# Patient Record
Sex: FEMALE | Race: OTHER | URBAN - METROPOLITAN AREA
[De-identification: names, ages, dates, MRNs, and addresses within clinical notes are randomized per-mention and may not be internally consistent; named-entity substitution may affect disease eponyms.]

---

## 2019-03-12 ENCOUNTER — EMERGENCY (EMERGENCY)
Facility: HOSPITAL | Age: 34
LOS: 1 days | Discharge: HOME | End: 2019-03-12
Attending: EMERGENCY MEDICINE | Admitting: EMERGENCY MEDICINE
Payer: COMMERCIAL

## 2019-03-12 VITALS
RESPIRATION RATE: 16 BRPM | DIASTOLIC BLOOD PRESSURE: 72 MMHG | TEMPERATURE: 98 F | SYSTOLIC BLOOD PRESSURE: 100 MMHG | HEART RATE: 78 BPM | OXYGEN SATURATION: 98 %

## 2019-03-12 VITALS
HEART RATE: 105 BPM | SYSTOLIC BLOOD PRESSURE: 114 MMHG | HEIGHT: 64.96 IN | WEIGHT: 99.87 LBS | RESPIRATION RATE: 18 BRPM | TEMPERATURE: 98 F | DIASTOLIC BLOOD PRESSURE: 78 MMHG

## 2019-03-12 DIAGNOSIS — Z3A.01 LESS THAN 8 WEEKS GESTATION OF PREGNANCY: ICD-10-CM

## 2019-03-12 DIAGNOSIS — O20.9 HEMORRHAGE IN EARLY PREGNANCY, UNSPECIFIED: ICD-10-CM

## 2019-03-12 DIAGNOSIS — R10.2 PELVIC AND PERINEAL PAIN: ICD-10-CM

## 2019-03-12 DIAGNOSIS — R10.9 UNSPECIFIED ABDOMINAL PAIN: ICD-10-CM

## 2019-03-12 LAB
ALBUMIN SERPL ELPH-MCNC: 5 G/DL — SIGNIFICANT CHANGE UP (ref 3.3–5)
ALP SERPL-CCNC: 48 U/L — SIGNIFICANT CHANGE UP (ref 40–120)
ALT FLD-CCNC: 9 U/L — LOW (ref 10–45)
ANION GAP SERPL CALC-SCNC: 13 MMOL/L — SIGNIFICANT CHANGE UP (ref 5–17)
APPEARANCE UR: CLEAR — SIGNIFICANT CHANGE UP
AST SERPL-CCNC: 17 U/L — SIGNIFICANT CHANGE UP (ref 10–40)
BASOPHILS # BLD AUTO: 0.03 K/UL — SIGNIFICANT CHANGE UP (ref 0–0.2)
BASOPHILS NFR BLD AUTO: 0.5 % — SIGNIFICANT CHANGE UP (ref 0–2)
BILIRUB SERPL-MCNC: 0.4 MG/DL — SIGNIFICANT CHANGE UP (ref 0.2–1.2)
BILIRUB UR-MCNC: NEGATIVE — SIGNIFICANT CHANGE UP
BLD GP AB SCN SERPL QL: NEGATIVE — SIGNIFICANT CHANGE UP
BUN SERPL-MCNC: 7 MG/DL — SIGNIFICANT CHANGE UP (ref 7–23)
CALCIUM SERPL-MCNC: 9.9 MG/DL — SIGNIFICANT CHANGE UP (ref 8.4–10.5)
CHLORIDE SERPL-SCNC: 102 MMOL/L — SIGNIFICANT CHANGE UP (ref 96–108)
CO2 SERPL-SCNC: 23 MMOL/L — SIGNIFICANT CHANGE UP (ref 22–31)
COLOR SPEC: YELLOW — SIGNIFICANT CHANGE UP
CREAT SERPL-MCNC: 0.58 MG/DL — SIGNIFICANT CHANGE UP (ref 0.5–1.3)
DIFF PNL FLD: ABNORMAL
EOSINOPHIL # BLD AUTO: 0.06 K/UL — SIGNIFICANT CHANGE UP (ref 0–0.5)
EOSINOPHIL NFR BLD AUTO: 1 % — SIGNIFICANT CHANGE UP (ref 0–6)
GLUCOSE SERPL-MCNC: 115 MG/DL — HIGH (ref 70–99)
GLUCOSE UR QL: NEGATIVE — SIGNIFICANT CHANGE UP
HCT VFR BLD CALC: 43.6 % — SIGNIFICANT CHANGE UP (ref 34.5–45)
HGB BLD-MCNC: 14.2 G/DL — SIGNIFICANT CHANGE UP (ref 11.5–15.5)
IMM GRANULOCYTES NFR BLD AUTO: 0.2 % — SIGNIFICANT CHANGE UP (ref 0–1.5)
KETONES UR-MCNC: 15 MG/DL
LEUKOCYTE ESTERASE UR-ACNC: NEGATIVE — SIGNIFICANT CHANGE UP
LYMPHOCYTES # BLD AUTO: 1.64 K/UL — SIGNIFICANT CHANGE UP (ref 1–3.3)
LYMPHOCYTES # BLD AUTO: 28.1 % — SIGNIFICANT CHANGE UP (ref 13–44)
MCHC RBC-ENTMCNC: 30 PG — SIGNIFICANT CHANGE UP (ref 27–34)
MCHC RBC-ENTMCNC: 32.6 GM/DL — SIGNIFICANT CHANGE UP (ref 32–36)
MCV RBC AUTO: 92.2 FL — SIGNIFICANT CHANGE UP (ref 80–100)
MONOCYTES # BLD AUTO: 0.51 K/UL — SIGNIFICANT CHANGE UP (ref 0–0.9)
MONOCYTES NFR BLD AUTO: 8.7 % — SIGNIFICANT CHANGE UP (ref 2–14)
NEUTROPHILS # BLD AUTO: 3.58 K/UL — SIGNIFICANT CHANGE UP (ref 1.8–7.4)
NEUTROPHILS NFR BLD AUTO: 61.5 % — SIGNIFICANT CHANGE UP (ref 43–77)
NITRITE UR-MCNC: NEGATIVE — SIGNIFICANT CHANGE UP
NRBC # BLD: 0 /100 WBCS — SIGNIFICANT CHANGE UP (ref 0–0)
PH UR: 6 — SIGNIFICANT CHANGE UP (ref 5–8)
PLATELET # BLD AUTO: 344 K/UL — SIGNIFICANT CHANGE UP (ref 150–400)
POTASSIUM SERPL-MCNC: 4.1 MMOL/L — SIGNIFICANT CHANGE UP (ref 3.5–5.3)
POTASSIUM SERPL-SCNC: 4.1 MMOL/L — SIGNIFICANT CHANGE UP (ref 3.5–5.3)
PROT SERPL-MCNC: 8.3 G/DL — SIGNIFICANT CHANGE UP (ref 6–8.3)
PROT UR-MCNC: ABNORMAL MG/DL
RBC # BLD: 4.73 M/UL — SIGNIFICANT CHANGE UP (ref 3.8–5.2)
RBC # FLD: 12.3 % — SIGNIFICANT CHANGE UP (ref 10.3–14.5)
RH IG SCN BLD-IMP: POSITIVE — SIGNIFICANT CHANGE UP
SODIUM SERPL-SCNC: 138 MMOL/L — SIGNIFICANT CHANGE UP (ref 135–145)
SP GR SPEC: 1.02 — SIGNIFICANT CHANGE UP (ref 1–1.03)
UROBILINOGEN FLD QL: 0.2 E.U./DL — SIGNIFICANT CHANGE UP
WBC # BLD: 5.83 K/UL — SIGNIFICANT CHANGE UP (ref 3.8–10.5)
WBC # FLD AUTO: 5.83 K/UL — SIGNIFICANT CHANGE UP (ref 3.8–10.5)

## 2019-03-12 PROCEDURE — 86850 RBC ANTIBODY SCREEN: CPT

## 2019-03-12 PROCEDURE — 81001 URINALYSIS AUTO W/SCOPE: CPT

## 2019-03-12 PROCEDURE — 86900 BLOOD TYPING SEROLOGIC ABO: CPT

## 2019-03-12 PROCEDURE — 84702 CHORIONIC GONADOTROPIN TEST: CPT

## 2019-03-12 PROCEDURE — 76856 US EXAM PELVIC COMPLETE: CPT | Mod: 26

## 2019-03-12 PROCEDURE — 76830 TRANSVAGINAL US NON-OB: CPT

## 2019-03-12 PROCEDURE — 87086 URINE CULTURE/COLONY COUNT: CPT

## 2019-03-12 PROCEDURE — 86901 BLOOD TYPING SEROLOGIC RH(D): CPT

## 2019-03-12 PROCEDURE — 76856 US EXAM PELVIC COMPLETE: CPT

## 2019-03-12 PROCEDURE — 99284 EMERGENCY DEPT VISIT MOD MDM: CPT

## 2019-03-12 PROCEDURE — 85025 COMPLETE CBC W/AUTO DIFF WBC: CPT

## 2019-03-12 PROCEDURE — 76830 TRANSVAGINAL US NON-OB: CPT | Mod: 26

## 2019-03-12 PROCEDURE — 36415 COLL VENOUS BLD VENIPUNCTURE: CPT

## 2019-03-12 PROCEDURE — 80053 COMPREHEN METABOLIC PANEL: CPT

## 2019-03-12 RX ORDER — NITROFURANTOIN MACROCRYSTAL 50 MG
1 CAPSULE ORAL
Qty: 14 | Refills: 0 | OUTPATIENT
Start: 2019-03-12 | End: 2019-03-18

## 2019-03-12 NOTE — ED ADULT NURSE NOTE - OBJECTIVE STATEMENT
Patient is a 32yo female reporting she had a positive pregnancy test yesterday and then developed mild lower abdominal cramping and vaginal bleeding with clots. Denies any fevers, n/v/d. One full-term pregnancy in the past.

## 2019-03-12 NOTE — ED PROVIDER NOTE - CLINICAL SUMMARY MEDICAL DECISION MAKING FREE TEXT BOX
Patient with vaginal bleeding and  pregnant. Beta hcg 65 , RH + and sono shows no IUP. Most likely a miscarriage however with risk of possible ectopic  flying would not be recommend. GYn was consulted and recommend no flights, repeat beta in two days in the ED. Return to ED if condition worsen

## 2019-03-12 NOTE — ED PROVIDER NOTE - NSFOLLOWUPINSTRUCTIONS_ED_ALL_ED_FT
Must return to ED in two days for repeat beta hcg. Prescription was sent to ED for + UA and u/c pending. Due to possibility of UTI and pregnancy will tx. Patient may be in [place] at time of urine culture returns.

## 2019-03-12 NOTE — ED PROVIDER NOTE - OBJECTIVE STATEMENT
32 y/o f with no pmh present to ED c/o vaginal bleeding since yesterday. Report of + pregnancy, , LMP 19 unconfirmed IUP. Patient is  from [place]. She state of bleeding has worsen. DEnies dizziness, sob, chest pain, fall, dysuria, or recent intercourse. No prior h/o ectopic pregnancy.

## 2019-03-12 NOTE — ED ADULT NURSE REASSESSMENT NOTE - NS ED NURSE REASSESS COMMENT FT1
All tests, US resulted, no new symptom complaint.  Vital signs stable.  Discharged to home in stable condition.

## 2019-03-12 NOTE — ED PROVIDER NOTE - ATTENDING CONTRIBUTION TO CARE
34yo F no PMH here w/ complaint of pregnancy, LMP end dec, vaginal spotting since yesterday. . pelvic as per PA w/ mod vaginal bleeding. bhcg 65, rh+. will check US and d/w GYN

## 2019-03-12 NOTE — ED ADULT TRIAGE NOTE - CHIEF COMPLAINT QUOTE
Patient a , just arrived from [place], states pregnancy test yesterday, a few hours later noted vaginal spotting, became stronger w/ passage of clots and tissues, mild abdominal cramping pain, w/ fatigue and back pain, no fever.  LMP end of January, states usual menstrual cycle is 45 days.

## 2019-03-12 NOTE — CONSULT NOTE ADULT - SUBJECTIVE AND OBJECTIVE BOX
34yo  at 7 wks by LMP 19 presents to the ED complaining of small vaginal bleeding and lower abdominal cramping since yesterday. Pt states abdominal pain is crampy in nature and more on the left side than the right side. She has radiating pain to her left flank. Pt denies taking any pain medications at home. She has small vaginal bleeding saturating less than 1 pad since the morning. She describes the bleeding as dark red with passage of small clots. Pt is a  from [facility] and stopped at [place] yesterday and was supposed to go back to [place] tomorrow. She is traveling with colleague who is with her today. Pt states this is a desired pregnancy although not planned. Pt found out she was pregnant by doing a home urine pregnancy test yesterday. She has not followed up with OB yet but has scheduled appointment this coming Friday in [place].   Pt denies fever, chills, chest pain, SOB, nausea, vomiting, lightheadedness or dizziness.     OB/GYN Hx:     x1 in 2016 per Pt preference- baby was full term no post-op complications  Pt states she has a hx of uterine fibroids   Last PAP smear- 2019- normal   Pt denies hx of STDs, HSV    PMHx: Pt denies   SHx:  x1   Meds: Pt denies   NKDA     PHYSICAL EXAM:   Vital Signs Last 24 Hrs  T(C): 36.9 (12 Mar 2019 15:34), Max: 36.9 (12 Mar 2019 15:34)  T(F): 98.4 (12 Mar 2019 15:34), Max: 98.4 (12 Mar 2019 15:34)  HR: 78 (12 Mar 2019 15:34) (77 - 105)  BP: 100/72 (12 Mar 2019 15:34) (100/68 - 114/78)  BP(mean): --  RR: 16 (12 Mar 2019 15:34) (16 - 18)  SpO2: 98% (12 Mar 2019 15:34) (97% - 98%)    **************************  Constitutional: No acute distress  Respiratory: Clear to ausculation bilaterally  Cardiovascular: regular rate and rhythm, no murmurs, or gallops  Gastrointestinal: soft, tenderness to palpation lower abdomen LLQ>RLQ, positive bowel sounds, no rebound or guarding, no CVA tenderness   Pelvic exam: Pt refused   Extremities: no calf tenderness or swelling    LABS:                        14.2   5.83  )-----------( 344      ( 12 Mar 2019 10:54 )             43.6         138  |  102  |  7   ----------------------------<  115<H>  4.1   |  23  |  0.58    Ca    9.9      12 Mar 2019 10:54    TPro  8.3  /  Alb  5.0  /  TBili  0.4  /  DBili  x   /  AST  17  /  ALT  9<L>  /  AlkPhos  48        Urinalysis Basic - ( 12 Mar 2019 10:54 )    Color: Yellow / Appearance: Clear / S.025 / pH: x  Gluc: x / Ketone: 15 mg/dL  / Bili: Negative / Urobili: 0.2 E.U./dL   Blood: x / Protein: Trace mg/dL / Nitrite: NEGATIVE   Leuk Esterase: NEGATIVE / RBC: Many /HPF / WBC 5-10 /HPF   Sq Epi: x / Non Sq Epi: Moderate /HPF / Bacteria: Present /HPF    HCG Quantitative, Serum: 65.4 mIU/mL ( @ 10:54)    RADIOLOGY & ADDITIONAL STUDIES:  EXAM:  US TRANSVAGINAL                          EXAM:  US PELVIC COMPLETE                          PROCEDURE DATE:  2019          INTERPRETATION:  PELVIC ULTRASOUND dated 3/12/2019 11:51 AM    INDICATION: 33-year-old female. vaginal spotting / pregnancy  LMP:   2019. Beta-hCG 65.4 miu/ml .    TECHNIQUE: Transabdominal views of the pelvis were obtained followed by   transvaginal views for better visualization of the ovaries.      PRIOR STUDIES: None    FINDINGS:   By dates 7 weeks 0 days pregnant.    These images demonstrate the uterus to be retroverted. The uterus is   normal in size and shape.  The uterus is 8.6 x 4.5 x 6.4 cm.  At least 2   intramural uterine leiomyomata including 1.8 cm right body and 0.8 cm   posterior body..  The endometrium is 0.7 cm in thickness, which is   normal. Fluid noted in the endocervical canal.    The right ovary is normal in size, measuring 2.1 x 1.6 x 2.1 cm. No right   ovarian masses are seen. The left ovary is normal in size, measuring 2.9   x 1.7 x 1.4 cm. No left ovarian masses are seen. Doppler evaluation   demonstrates flow to both ovaries with no evidence of torsion.    No free fluid is seen in the cul-de-sac.      IMPRESSION: No intrauterine or extrauterine pregnancy identified. Fluid   noted in relation to the endocervical canal. Could represent spontaneous   . No retained products of conception in uterus. Follow-up with   beta hCG and pelvic ultrasound recommended.    "Thank you for the opportunity to participate in the care of this   patient."    KERI GANT M.D., ATTENDING RADIOLOGIST  This document has been electronically signed. Mar 12 2019 12:40PM

## 2019-03-12 NOTE — CONSULT NOTE ADULT - ASSESSMENT
32yo  at 7 wks by LMP 19 presents to the ED with pregnancy of unknown location. bHCG is 65.4 and TVUS no IUP or EUP. Vitals stable and labs within normal. Rh+. Plan for Pt to return in 48 hours for repeat bHCG on 3/14/19. Differential diagnoses includes early pregnancy, missed , or ectopic pregnancy. Pt given ectopic pregnancy precautions including severe abdominal pain, lightheaded, or palpitations. She understands that an ectopic pregnancy is life threatening and can cause death from hemorrhage. Therefore she is highly advised not to travel to [place] until cleared by OB/GYN here. She agrees with plan and states she will follow up with us here at [facility] ED for repeat bHCG in 48hrs. Pt can take Tylenol at home for pain. Pt discussed with Dr. Norris.

## 2019-03-13 LAB
CULTURE RESULTS: SIGNIFICANT CHANGE UP
SPECIMEN SOURCE: SIGNIFICANT CHANGE UP

## 2019-03-14 ENCOUNTER — EMERGENCY (EMERGENCY)
Facility: HOSPITAL | Age: 34
LOS: 1 days | Discharge: HOME | End: 2019-03-14
Attending: EMERGENCY MEDICINE | Admitting: EMERGENCY MEDICINE
Payer: COMMERCIAL

## 2019-03-14 VITALS
RESPIRATION RATE: 18 BRPM | HEART RATE: 65 BPM | SYSTOLIC BLOOD PRESSURE: 111 MMHG | TEMPERATURE: 98 F | DIASTOLIC BLOOD PRESSURE: 68 MMHG | OXYGEN SATURATION: 100 %

## 2019-03-14 VITALS
OXYGEN SATURATION: 100 % | HEART RATE: 88 BPM | HEIGHT: 59.84 IN | SYSTOLIC BLOOD PRESSURE: 101 MMHG | RESPIRATION RATE: 18 BRPM | DIASTOLIC BLOOD PRESSURE: 70 MMHG | WEIGHT: 106.92 LBS | TEMPERATURE: 98 F

## 2019-03-14 DIAGNOSIS — O20.9 HEMORRHAGE IN EARLY PREGNANCY, UNSPECIFIED: ICD-10-CM

## 2019-03-14 DIAGNOSIS — Z79.2 LONG TERM (CURRENT) USE OF ANTIBIOTICS: ICD-10-CM

## 2019-03-14 DIAGNOSIS — O20.0 THREATENED ABORTION: ICD-10-CM

## 2019-03-14 DIAGNOSIS — Z3A.00 WEEKS OF GESTATION OF PREGNANCY NOT SPECIFIED: ICD-10-CM

## 2019-03-14 LAB
BASOPHILS # BLD AUTO: 0.03 K/UL — SIGNIFICANT CHANGE UP (ref 0–0.2)
BASOPHILS NFR BLD AUTO: 0.7 % — SIGNIFICANT CHANGE UP (ref 0–2)
EOSINOPHIL # BLD AUTO: 0.19 K/UL — SIGNIFICANT CHANGE UP (ref 0–0.5)
EOSINOPHIL NFR BLD AUTO: 4.7 % — SIGNIFICANT CHANGE UP (ref 0–6)
HCG SERPL-ACNC: 14.2 MIU/ML — HIGH
HCT VFR BLD CALC: 41.1 % — SIGNIFICANT CHANGE UP (ref 34.5–45)
HGB BLD-MCNC: 13.1 G/DL — SIGNIFICANT CHANGE UP (ref 11.5–15.5)
IMM GRANULOCYTES NFR BLD AUTO: 0.2 % — SIGNIFICANT CHANGE UP (ref 0–1.5)
LYMPHOCYTES # BLD AUTO: 1.39 K/UL — SIGNIFICANT CHANGE UP (ref 1–3.3)
LYMPHOCYTES # BLD AUTO: 34.7 % — SIGNIFICANT CHANGE UP (ref 13–44)
MCHC RBC-ENTMCNC: 29.8 PG — SIGNIFICANT CHANGE UP (ref 27–34)
MCHC RBC-ENTMCNC: 31.9 GM/DL — LOW (ref 32–36)
MCV RBC AUTO: 93.4 FL — SIGNIFICANT CHANGE UP (ref 80–100)
MONOCYTES # BLD AUTO: 0.44 K/UL — SIGNIFICANT CHANGE UP (ref 0–0.9)
MONOCYTES NFR BLD AUTO: 11 % — SIGNIFICANT CHANGE UP (ref 2–14)
NEUTROPHILS # BLD AUTO: 1.95 K/UL — SIGNIFICANT CHANGE UP (ref 1.8–7.4)
NEUTROPHILS NFR BLD AUTO: 48.7 % — SIGNIFICANT CHANGE UP (ref 43–77)
NRBC # BLD: 0 /100 WBCS — SIGNIFICANT CHANGE UP (ref 0–0)
PLATELET # BLD AUTO: 295 K/UL — SIGNIFICANT CHANGE UP (ref 150–400)
RBC # BLD: 4.4 M/UL — SIGNIFICANT CHANGE UP (ref 3.8–5.2)
RBC # FLD: 12.4 % — SIGNIFICANT CHANGE UP (ref 10.3–14.5)
WBC # BLD: 4.01 K/UL — SIGNIFICANT CHANGE UP (ref 3.8–10.5)
WBC # FLD AUTO: 4.01 K/UL — SIGNIFICANT CHANGE UP (ref 3.8–10.5)

## 2019-03-14 PROCEDURE — 99284 EMERGENCY DEPT VISIT MOD MDM: CPT

## 2019-03-14 PROCEDURE — 84702 CHORIONIC GONADOTROPIN TEST: CPT

## 2019-03-14 PROCEDURE — 99284 EMERGENCY DEPT VISIT MOD MDM: CPT | Mod: 25

## 2019-03-14 PROCEDURE — 36415 COLL VENOUS BLD VENIPUNCTURE: CPT

## 2019-03-14 PROCEDURE — 85025 COMPLETE CBC W/AUTO DIFF WBC: CPT

## 2019-03-14 NOTE — ED PROVIDER NOTE - ADDITIONAL RISK FACTOR FREE TEXT BOX
VB in early pregnancy, no pain.  HDS.  Marked downtrend  in HCG, suspect miscarriage.  Seen by GYN, repeat imaging not needed.  Patient will follow up with her GYN in [facility] in a few days.

## 2019-03-14 NOTE — ED PROVIDER NOTE - ATTENDING CONTRIBUTION TO CARE
34 yo fem seen here 2 days ago for pelvic cramps and VB, preg positive with HCG 65, US pregnancy undetermined location.  Here for repeat HCG.  Not having any pelvic pain/cramping.  Still with light bleeding, 1 pad/day.  Not weak/lightheaded.  Rh positive last visit.  She delayed her return home to [place] as recommended due to pregnancy of unknown location.  LMP 19  OB Hx       Agree with above HPI of PA    PE - no acute distress  Pt reports light bleeding only   Pelvic referred to GYN but pt declines  Otherwise normal exam    Pt feels ok to be discharged and fly home to [place]  B-hcg most likely declining and pt having miscarriage.    Pt aware to follow up with GYN upon returning home.

## 2019-03-14 NOTE — ED PROVIDER NOTE - OBJECTIVE STATEMENT
32 yo fem seen here 2 days ago for pelvic cramps and VB, preg positive with HCG 65, US pregnancy undetermined location.  Here for repeat HCG.  Not having any pelvic pain/cramping.  Still with light bleeding, 1 pad/day.  Not weak/lightheaded.  Rh positive last visit.  She delayed her return home to [place] as recommended due to pregnancy of unknown location.  LMP 19  OB Hx

## 2019-03-14 NOTE — CONSULT NOTE ADULT - SUBJECTIVE AND OBJECTIVE BOX
32yo  at 7 wks 2d by LMP 19 presents to the ED for repeat bHCG check. She was here two days ago complaining of some vaginal bleeding and lower abdominal pain..  Pt denies fever, chills, chest pain, SOB, abdominal pain, nausea, vomiting, vaginal bleeding    OB/GYN Hx:     x1 in 2016 per Pt preference- baby was full term no post-op complications  Pt states she has a hx of uterine fibroids   Last PAP smear- 2019- normal   Pt denies hx of STDs, HSV    PMHx: Pt denies   SHx:  x1   Meds: Pt denies   NKDA     PHYSICAL EXAM:   Vital Signs Last 24 Hrs  T(C): 36.8 (14 Mar 2019 09:42), Max: 36.8 (14 Mar 2019 09:42)  T(F): 98.3 (14 Mar 2019 09:42), Max: 98.3 (14 Mar 2019 09:42)  HR: 88 (14 Mar 2019 09:42) (88 - 88)  BP: 101/70 (14 Mar 2019 09:42) (101/70 - 101/70)  BP(mean): --  RR: 18 (14 Mar 2019 09:42) (18 - 18)  SpO2: 100% (14 Mar 2019 09:42) (100% - 100%)    **************************  Constitutional: Alert & Oriented x3, No acute distress  Respiratory: Clear to ausculation bilaterally; no wheezing, rhonchi, or crackles  Cardiovascular: regular rate and rhythm, no murmurs, or gallops  Gastrointestinal: soft, non tender, positive bowel sounds, no rebound or guarding   Pelvic exam:   Extremities: no calf tenderness or swelling    LABS:                        13.1   4.01  )-----------( 295      ( 14 Mar 2019 10:26 )             41.1     03-12    138  |  102  |  7   ----------------------------<  115<H>  4.1   |  23  |  0.58    Ca    9.9      12 Mar 2019 10:54    TPro  8.3  /  Alb  5.0  /  TBili  0.4  /  DBili  x   /  AST  17  /  ALT  9<L>  /  AlkPhos  48  03-12      Urinalysis Basic - ( 12 Mar 2019 10:54 )    Color: Yellow / Appearance: Clear / S.025 / pH: x  Gluc: x / Ketone: 15 mg/dL  / Bili: Negative / Urobili: 0.2 E.U./dL   Blood: x / Protein: Trace mg/dL / Nitrite: NEGATIVE   Leuk Esterase: NEGATIVE / RBC: Many /HPF / WBC 5-10 /HPF   Sq Epi: x / Non Sq Epi: Moderate /HPF / Bacteria: Present /HPF    RADIOLOGY & ADDITIONAL STUDIES: 34yo  at 7 wks 2d by LMP 19 presents to the ED for repeat bHCG check. She was here two days ago complaining of some vaginal bleeding and lower abdominal pain and had a bHCG of 65. Today Pt reports bleeding has decreased and she has saturated through 1 pad since last night. She states she has mild lower abdominal cramping and she hasn't taken any pain medication for it. Pt is eager to go back to [place].   Pt denies fever, chills, chest pain, SOB, nausea, vomiting, lightheadedness or dizziness.     OB/GYN Hx:     x1 in 2016 per Pt preference- baby was full term no post-op complications  Pt states she has a hx of uterine fibroids   Last PAP smear- 2019- normal   Pt denies hx of STDs, HSV    PMHx: Pt denies   SHx:  x1   Meds: Pt denies   NKDA     PHYSICAL EXAM:   Vital Signs Last 24 Hrs  T(C): 36.8 (14 Mar 2019 09:42), Max: 36.8 (14 Mar 2019 09:42)  T(F): 98.3 (14 Mar 2019 09:42), Max: 98.3 (14 Mar 2019 09:42)  HR: 88 (14 Mar 2019 09:42) (88 - 88)  BP: 101/70 (14 Mar 2019 09:42) (101/70 - 101/70)  BP(mean): --  RR: 18 (14 Mar 2019 09:42) (18 - 18)  SpO2: 100% (14 Mar 2019 09:42) (100% - 100%)    **************************  Constitutional: Alert & Oriented x3, No acute distress  Respiratory: Clear to ausculation bilaterally  Cardiovascular: regular rate and rhythm   Gastrointestinal: soft, nontender, positive bowel sounds, no rebound or guarding   Pelvic exam: Pt refused   Extremities: no calf tenderness or swelling    LABS:                        13.1   4.01  )-----------( 295      ( 14 Mar 2019 10:26 )             41.1     03-12    138  |  102  |  7   ----------------------------<  115<H>  4.1   |  23  |  0.58    Ca    9.9      12 Mar 2019 10:54    TPro  8.3  /  Alb  5.0  /  TBili  0.4  /  DBili  x   /  AST  17  /  ALT  9<L>  /  AlkPhos  48  03-12      Urinalysis Basic - ( 12 Mar 2019 10:54 )    Color: Yellow / Appearance: Clear / S.025 / pH: x  Gluc: x / Ketone: 15 mg/dL  / Bili: Negative / Urobili: 0.2 E.U./dL   Blood: x / Protein: Trace mg/dL / Nitrite: NEGATIVE   Leuk Esterase: NEGATIVE / RBC: Many /HPF / WBC 5-10 /HPF   Sq Epi: x / Non Sq Epi: Moderate /HPF / Bacteria: Present /HPF

## 2019-03-14 NOTE — ED ADULT TRIAGE NOTE - OTHER COMPLAINTS
A0 As per pt, pt was also diagnosed with UTI 2 days ago; however, pt has not been taking antibiotic. Pt C/O vaginal bleeding. Pt denies abdominal pian, burning urination, fever, chills, chest pain, SOB, dizziness.

## 2019-03-14 NOTE — ED ADULT NURSE NOTE - OBJECTIVE STATEMENT
32 y/o female c/o vaginal bleeding, was seen at [facility] 2 days ago and instructed to return to ED for follow up hcg level to check for possible miscarriage.

## 2019-03-14 NOTE — ED PROVIDER NOTE - NSFOLLOWUPINSTRUCTIONS_ED_ALL_ED_FT
Please follow up with your OB-GYN when you return to [place].  Return to the Emergency Room for any new/worsening symptoms, or any concerns at all.  ________________________________________________________________________________  Miscarriage  A miscarriage is the loss of an unborn baby (fetus) before the 20th week of pregnancy.    Follow these instructions at home:  Medicines     Take over-the-counter and prescription medicines only as told by your doctor.  If you were prescribed antibiotic medicine, take it as told by your doctor. Do not stop taking the antibiotic even if you start to feel better.  Image Do not take NSAIDs unless your doctor says that this is safe for you. NSAIDs include aspirin and ibuprofen. These medicines can cause bleeding.  Activity     Rest as directed. Ask your doctor what activities are safe for you.  Have someone help you at home during this time.  General instructions     Write down how many pads you use each day and how soaked they are.  Watch the amount of tissue or clumps of blood (blood clots) that you pass from your vagina. Save any large amounts of tissue for your doctor.  Do not use tampons, douche, or have sex until your doctor approves.  To help you and your partner with the process of grieving, talk with your doctor or seek counseling.  When you are ready, meet with your doctor to talk about steps you should take for your health. Also, talk with your doctor about steps to take to have a healthy pregnancy in the future.  Keep all follow-up visits as told by your doctor. This is important.  Contact a doctor if:  You have a fever or chills.  You have vaginal discharge that smells bad.  You have more bleeding.  Get help right away if:  You have very bad cramps or pain in your back or belly.  You pass clumps of blood that are walnut-sized or larger from your vagina.  You pass tissue that is walnut-sized or larger from your vagina.  You soak more than 1 regular pad in an hour.  You get light-headed or weak.  You faint (pass out).  You have feelings of sadness that do not go away, or you have thoughts of hurting yourself.  Summary  A miscarriage is the loss of an unborn baby before the 20th week of pregnancy.  Follow your doctor's instructions for home care. Keep all follow-up appointments.  To help you and your partner with the process of grieving, talk with your doctor or seek counseling.  This information is not intended to replace advice given to you by your health care provider. Make sure you discuss any questions you have with your health care provider.

## 2019-03-14 NOTE — CONSULT NOTE ADULT - ASSESSMENT
34yo  at 7 wks 2d by LMP 19 with incomplete . Pt's bHCG is has decreased from 65.4 to 14.2 consistent with missed . Vitals within normal range and hemoglobin. She is hemodynamically stable and refused pelvic examination. Pt given option for expectant management, medical management with Cytotec (with risks such as abdominal pain, nausea, vomiting, diarrhea discussed), and option of dilation and curettage (with risks discussed). Pt opted for expectant management. Pt understands that she needs to follow up with her OB/GYN next week in [facility]. She is to go to nearest ED if fever >100.4, severe abdominal pain, or heavy vaginal bleeding. Over the counter Motrin or Tylenol for pain. Pt discussed with Dr. Shah.

## 2019-03-14 NOTE — ED PROVIDER NOTE - CLINICAL SUMMARY MEDICAL DECISION MAKING FREE TEXT BOX
VB in early pregnancy, no pain.  HDS.  Marked downtrend  in HCG, suspect miscarriage.  Seen by GYN, repeat imaging not needed; cleared for DC home to [facility].  Patient will follow up with her GYN in [facility] in a few days.

## 2019-03-14 NOTE — ED PROVIDER NOTE - PHYSICAL EXAMINATION
GEN: awake, alert, NAD  EYES: Conj clear, Pupils equal and round.  PULM: Lungs clear bilat  CV: RRR S1S2  GI: Abd soft, nontender  : No CVAT.  Pelvic deferred for GYN  MSK: GERMAN without difficulty  SKIN: normal color and turgor, no rash  NEURO: Alert, oriented. GERMAN normally.  Speech clear.  Gait steady.

## 2025-03-28 NOTE — ED PROVIDER NOTE - PENDING LAB RAD OPT OUT
home Exclude Pending Lab and Radiology orders from printing on the Patient's Discharge Instructions, due to Privacy Concerns.